# Patient Record
Sex: FEMALE | Race: ASIAN | Employment: FULL TIME | URBAN - METROPOLITAN AREA
[De-identification: names, ages, dates, MRNs, and addresses within clinical notes are randomized per-mention and may not be internally consistent; named-entity substitution may affect disease eponyms.]

---

## 2017-05-23 ENCOUNTER — OFFICE VISIT (OUTPATIENT)
Dept: INTERNAL MEDICINE CLINIC | Facility: CLINIC | Age: 47
End: 2017-05-23

## 2017-05-23 VITALS
SYSTOLIC BLOOD PRESSURE: 121 MMHG | TEMPERATURE: 99 F | DIASTOLIC BLOOD PRESSURE: 75 MMHG | WEIGHT: 126.5 LBS | HEART RATE: 80 BPM | BODY MASS INDEX: 24 KG/M2

## 2017-05-23 DIAGNOSIS — J01.00 ACUTE NON-RECURRENT MAXILLARY SINUSITIS: Primary | ICD-10-CM

## 2017-05-23 PROCEDURE — 99213 OFFICE O/P EST LOW 20 MIN: CPT | Performed by: INTERNAL MEDICINE

## 2017-05-23 PROCEDURE — 99212 OFFICE O/P EST SF 10 MIN: CPT | Performed by: INTERNAL MEDICINE

## 2017-05-23 RX ORDER — CETIRIZINE HYDROCHLORIDE 10 MG/1
10 TABLET ORAL DAILY
COMMUNITY

## 2017-05-23 RX ORDER — AMOXICILLIN 875 MG/1
875 TABLET, COATED ORAL 2 TIMES DAILY
Qty: 20 TABLET | Refills: 0 | Status: SHIPPED | OUTPATIENT
Start: 2017-05-23 | End: 2017-06-02

## 2017-05-23 NOTE — PROGRESS NOTES
HPI:    Patient ID: Amalia Lujan is a 48year old female. Sinus Problem  This is a new problem. The current episode started in the past 7 days. The problem has been gradually worsening since onset. There has been no fever. The pain is mild.  Associated s diaphoretic. Psychiatric: She has a normal mood and affect. Her behavior is normal.   Vitals reviewed. ASSESSMENT/PLAN:   Acute non-recurrent maxillary sinusitis  (primary encounter diagnosis)    Amoxicillin  Fluids.   Rest.    No orders of th

## 2017-05-25 ENCOUNTER — OFFICE VISIT (OUTPATIENT)
Dept: INTERNAL MEDICINE CLINIC | Facility: CLINIC | Age: 47
End: 2017-05-25

## 2017-05-25 VITALS
HEIGHT: 60 IN | DIASTOLIC BLOOD PRESSURE: 70 MMHG | HEART RATE: 76 BPM | SYSTOLIC BLOOD PRESSURE: 106 MMHG | TEMPERATURE: 100 F | BODY MASS INDEX: 24.54 KG/M2 | WEIGHT: 125 LBS

## 2017-05-25 DIAGNOSIS — G47.00 INSOMNIA, UNSPECIFIED TYPE: ICD-10-CM

## 2017-05-25 DIAGNOSIS — M77.02 MEDIAL EPICONDYLITIS OF ELBOW, LEFT: Primary | ICD-10-CM

## 2017-05-25 PROBLEM — M77.00 MEDIAL EPICONDYLITIS OF ELBOW: Status: ACTIVE | Noted: 2017-05-25

## 2017-05-25 PROCEDURE — 99212 OFFICE O/P EST SF 10 MIN: CPT | Performed by: INTERNAL MEDICINE

## 2017-05-25 PROCEDURE — 99213 OFFICE O/P EST LOW 20 MIN: CPT | Performed by: INTERNAL MEDICINE

## 2017-05-25 RX ORDER — ZOLPIDEM TARTRATE 5 MG/1
5 TABLET ORAL NIGHTLY PRN
Qty: 30 TABLET | Refills: 0 | Status: SHIPPED | OUTPATIENT
Start: 2017-05-25 | End: 2017-12-26

## 2017-05-25 RX ORDER — MELOXICAM 15 MG/1
15 TABLET ORAL DAILY
Qty: 14 TABLET | Refills: 0 | Status: SHIPPED | OUTPATIENT
Start: 2017-05-25

## 2017-05-25 NOTE — PROGRESS NOTES
HPI:    Patient ID: Ashleigh Flores is a 48year old female. Elbow Pain   The pain is present in the left elbow. This is a new problem. The current episode started in the past 7 days (2 days). There has been no history of extremity trauma.  The problem occu instead. (G47.00) Insomnia, unspecified type  Plan: pt works night shift as a nurse so  Having difficulty of sleeping in day time and also when she is off work. She had asked for ambien and pt given script for short term use.            No orders of the

## 2017-12-26 ENCOUNTER — OFFICE VISIT (OUTPATIENT)
Dept: INTERNAL MEDICINE CLINIC | Facility: CLINIC | Age: 47
End: 2017-12-26

## 2017-12-26 VITALS
BODY MASS INDEX: 25.16 KG/M2 | TEMPERATURE: 103 F | HEIGHT: 60 IN | HEART RATE: 99 BPM | WEIGHT: 128.13 LBS | DIASTOLIC BLOOD PRESSURE: 86 MMHG | SYSTOLIC BLOOD PRESSURE: 136 MMHG

## 2017-12-26 DIAGNOSIS — J02.9 SORE THROAT: Primary | ICD-10-CM

## 2017-12-26 DIAGNOSIS — R50.81 FEVER IN OTHER DISEASES: ICD-10-CM

## 2017-12-26 PROCEDURE — 99212 OFFICE O/P EST SF 10 MIN: CPT | Performed by: INTERNAL MEDICINE

## 2017-12-26 PROCEDURE — 99214 OFFICE O/P EST MOD 30 MIN: CPT | Performed by: INTERNAL MEDICINE

## 2017-12-26 PROCEDURE — 87880 STREP A ASSAY W/OPTIC: CPT | Performed by: INTERNAL MEDICINE

## 2017-12-26 RX ORDER — AMOXICILLIN 500 MG/1
500 TABLET, FILM COATED ORAL 3 TIMES DAILY
Qty: 30 TABLET | Refills: 0 | Status: SHIPPED | OUTPATIENT
Start: 2017-12-26 | End: 2018-01-25 | Stop reason: ALTCHOICE

## 2017-12-26 RX ORDER — ACETAMINOPHEN 500 MG
500 TABLET ORAL EVERY 6 HOURS PRN
COMMUNITY

## 2017-12-26 NOTE — PROGRESS NOTES
HPI:    Patient ID: Kaur Fernandez is a 50year old female. Fever    This is a new problem. The current episode started yesterday. The problem occurs intermittently. The problem has been waxing and waning. The maximum temperature noted was 102 to 102.9 F. discharge. Left eye exhibits no discharge. No scleral icterus. Neck: Normal range of motion. Neck supple. No neck rigidity. No erythema and normal range of motion present. No Brudzinski's sign noted. No thyromegaly present.    Cardiovascular: Normal rate,

## 2017-12-27 ENCOUNTER — TELEPHONE (OUTPATIENT)
Dept: INTERNAL MEDICINE CLINIC | Facility: CLINIC | Age: 47
End: 2017-12-27

## 2017-12-27 DIAGNOSIS — Z00.00 ANNUAL PHYSICAL EXAM: Primary | ICD-10-CM

## 2018-01-24 ENCOUNTER — NURSE TRIAGE (OUTPATIENT)
Dept: OTHER | Age: 48
End: 2018-01-24

## 2018-01-24 NOTE — TELEPHONE ENCOUNTER
Action Requested: Summary for Provider     []  Critical Lab, Recommendations Needed  [x] Need Additional Advice  []   FYI    []   Need Orders  [] Need Medications Sent to Pharmacy  []  Other     SUMMARY: appt scheduled 1/25/18 for sore throat, no appt avai

## 2018-01-24 NOTE — TELEPHONE ENCOUNTER
MD Michelle Castañeda, RN; Em Rn Triage 1 minute ago (10:39 AM)     No more apptments today; she may consider UC if she does want to be evaluated today.  (Routing comment)

## 2018-01-24 NOTE — TELEPHONE ENCOUNTER
Advised patient of Dr. Bree Jackson note. Patient verbalized understanding and will keep appointment for tomorrow.

## 2018-01-25 ENCOUNTER — OFFICE VISIT (OUTPATIENT)
Dept: INTERNAL MEDICINE CLINIC | Facility: CLINIC | Age: 48
End: 2018-01-25

## 2018-01-25 VITALS
DIASTOLIC BLOOD PRESSURE: 70 MMHG | HEIGHT: 60 IN | WEIGHT: 126 LBS | BODY MASS INDEX: 24.74 KG/M2 | SYSTOLIC BLOOD PRESSURE: 107 MMHG | TEMPERATURE: 98 F | HEART RATE: 67 BPM

## 2018-01-25 DIAGNOSIS — J02.9 SORE THROAT: Primary | ICD-10-CM

## 2018-01-25 DIAGNOSIS — R05.9 COUGH: ICD-10-CM

## 2018-01-25 LAB
CONTROL LINE PRESENT WITH A CLEAR BACKGROUND (YES/NO): YES YES/NO
FLUAV + FLUBV RNA SPEC NAA+PROBE: NEGATIVE
KIT LOT #: NORMAL NUMERIC

## 2018-01-25 PROCEDURE — 87880 STREP A ASSAY W/OPTIC: CPT | Performed by: INTERNAL MEDICINE

## 2018-01-25 PROCEDURE — 99214 OFFICE O/P EST MOD 30 MIN: CPT | Performed by: INTERNAL MEDICINE

## 2018-01-25 PROCEDURE — 99212 OFFICE O/P EST SF 10 MIN: CPT | Performed by: INTERNAL MEDICINE

## 2018-01-25 RX ORDER — AMOXICILLIN AND CLAVULANATE POTASSIUM 875; 125 MG/1; MG/1
1 TABLET, FILM COATED ORAL 2 TIMES DAILY
Qty: 20 TABLET | Refills: 0 | Status: SHIPPED | OUTPATIENT
Start: 2018-01-25 | End: 2018-02-04

## 2018-01-25 NOTE — PROGRESS NOTES
HPI:    Patient ID: Дмитрий Bowen is a 50year old female. Sore Throat    This is a new problem. The current episode started in the past 7 days (3 days). The maximum temperature recorded prior to her arrival was 100.4 - 100.9 F.  The fever has been presen Conjunctivae are normal. Pupils are equal, round, and reactive to light. Right eye exhibits no discharge. Left eye exhibits no discharge. No scleral icterus. Neck: Normal range of motion. Neck supple. No JVD present. No thyromegaly present.    Cardiovascu

## 2018-01-26 ENCOUNTER — TELEPHONE (OUTPATIENT)
Dept: INTERNAL MEDICINE CLINIC | Facility: CLINIC | Age: 48
End: 2018-01-26

## 2018-01-26 RX ORDER — TOBRAMYCIN 3 MG/ML
SOLUTION/ DROPS OPHTHALMIC
Qty: 1 BOTTLE | Refills: 0 | Status: SHIPPED | OUTPATIENT
Start: 2018-01-26

## 2018-01-26 NOTE — TELEPHONE ENCOUNTER
Pt states having drainage in right eye and having some pain/ discomfort and states saw Dr. Marco Zhou yesterday.

## 2018-01-26 NOTE — TELEPHONE ENCOUNTER
Patient saw Dr James Cano yesterday and started augmentin this morning. Patient woke up this morning with discharge from right eye-first was white now it is yellow in color. Right eye is watery, white part of the right eye is red and irritated.   Left eye

## 2018-01-26 NOTE — TELEPHONE ENCOUNTER
Pt called to follow up on medication request. Reviewed doctor's instructions with pt, pt agreed with plan of care. Prescription sent to [facility] pharmacy per doctor's instructions.

## 2019-01-22 ENCOUNTER — OFFICE VISIT (OUTPATIENT)
Dept: INTERNAL MEDICINE CLINIC | Facility: CLINIC | Age: 49
End: 2019-01-22
Payer: COMMERCIAL

## 2019-01-22 VITALS
TEMPERATURE: 100 F | HEART RATE: 98 BPM | BODY MASS INDEX: 24.54 KG/M2 | RESPIRATION RATE: 12 BRPM | DIASTOLIC BLOOD PRESSURE: 71 MMHG | HEIGHT: 60 IN | WEIGHT: 125 LBS | SYSTOLIC BLOOD PRESSURE: 108 MMHG

## 2019-01-22 DIAGNOSIS — J01.90 ACUTE SINUSITIS, RECURRENCE NOT SPECIFIED, UNSPECIFIED LOCATION: Primary | ICD-10-CM

## 2019-01-22 PROBLEM — M77.00 MEDIAL EPICONDYLITIS OF ELBOW: Status: RESOLVED | Noted: 2017-05-25 | Resolved: 2019-01-22

## 2019-01-22 PROCEDURE — 99212 OFFICE O/P EST SF 10 MIN: CPT | Performed by: INTERNAL MEDICINE

## 2019-01-22 PROCEDURE — 99214 OFFICE O/P EST MOD 30 MIN: CPT | Performed by: INTERNAL MEDICINE

## 2019-01-22 RX ORDER — ZOLPIDEM TARTRATE 10 MG/1
10 TABLET ORAL NIGHTLY PRN
Qty: 15 TABLET | Refills: 0 | Status: SHIPPED | OUTPATIENT
Start: 2019-01-22

## 2019-01-22 RX ORDER — MULTIVIT WITH MINERALS/LUTEIN
1000 TABLET ORAL DAILY
COMMUNITY

## 2019-01-22 RX ORDER — AMOXICILLIN 875 MG/1
875 TABLET, COATED ORAL 2 TIMES DAILY
Qty: 20 TABLET | Refills: 0 | Status: SHIPPED | OUTPATIENT
Start: 2019-01-22

## 2019-01-22 NOTE — PROGRESS NOTES
HPI:    Patient ID: Sage Brunson is a 46year old female. Nasal Congestion   Associated symptoms include congestion, a fever and headaches. Pertinent negatives include no sore throat or swollen glands. Sinus Problem   This is a new problem.  The jaye edema and rhinorrhea present. Left sinus exhibits frontal sinus tenderness. Mouth/Throat: Oropharynx is clear and moist. No oropharyngeal exudate. Eyes: Conjunctivae are normal. Right eye exhibits no discharge. Left eye exhibits no discharge.  No sclera

## 2020-05-19 RX ORDER — ZOLPIDEM TARTRATE 10 MG/1
TABLET ORAL
Qty: 15 TABLET | Refills: 0 | OUTPATIENT
Start: 2020-05-19

## 2020-08-22 ENCOUNTER — OFFICE VISIT (OUTPATIENT)
Dept: INTERNAL MEDICINE CLINIC | Facility: CLINIC | Age: 50
End: 2020-08-22
Payer: COMMERCIAL

## 2020-08-22 ENCOUNTER — LAB ENCOUNTER (OUTPATIENT)
Dept: LAB | Age: 50
End: 2020-08-22
Attending: INTERNAL MEDICINE
Payer: COMMERCIAL

## 2020-08-22 VITALS
WEIGHT: 132 LBS | TEMPERATURE: 97 F | HEART RATE: 76 BPM | BODY MASS INDEX: 24.92 KG/M2 | HEIGHT: 61 IN | SYSTOLIC BLOOD PRESSURE: 136 MMHG | DIASTOLIC BLOOD PRESSURE: 86 MMHG

## 2020-08-22 DIAGNOSIS — M54.50 ACUTE LEFT-SIDED LOW BACK PAIN WITHOUT SCIATICA: ICD-10-CM

## 2020-08-22 DIAGNOSIS — Z78.0 MENOPAUSE: ICD-10-CM

## 2020-08-22 DIAGNOSIS — Z00.00 ANNUAL PHYSICAL EXAM: ICD-10-CM

## 2020-08-22 DIAGNOSIS — Z00.00 ANNUAL PHYSICAL EXAM: Primary | ICD-10-CM

## 2020-08-22 LAB
ALBUMIN SERPL-MCNC: 3.6 G/DL (ref 3.4–5)
ALBUMIN/GLOB SERPL: 0.8 {RATIO} (ref 1–2)
ALP LIVER SERPL-CCNC: 84 U/L (ref 41–108)
ALT SERPL-CCNC: 34 U/L (ref 13–56)
ANION GAP SERPL CALC-SCNC: 5 MMOL/L (ref 0–18)
AST SERPL-CCNC: 27 U/L (ref 15–37)
BILIRUB SERPL-MCNC: 0.4 MG/DL (ref 0.1–2)
BUN BLD-MCNC: 12 MG/DL (ref 7–18)
BUN/CREAT SERPL: 18.8 (ref 10–20)
CALCIUM BLD-MCNC: 9.3 MG/DL (ref 8.5–10.1)
CHLORIDE SERPL-SCNC: 110 MMOL/L (ref 98–112)
CHOLEST SMN-MCNC: 173 MG/DL (ref ?–200)
CO2 SERPL-SCNC: 26 MMOL/L (ref 21–32)
CREAT BLD-MCNC: 0.64 MG/DL (ref 0.55–1.02)
EST. AVERAGE GLUCOSE BLD GHB EST-MCNC: 137 MG/DL (ref 68–126)
GLOBULIN PLAS-MCNC: 4.5 G/DL (ref 2.8–4.4)
GLUCOSE BLD-MCNC: 115 MG/DL (ref 70–99)
HBA1C MFR BLD HPLC: 6.4 % (ref ?–5.7)
HDLC SERPL-MCNC: 63 MG/DL (ref 40–59)
LDLC SERPL CALC-MCNC: 93 MG/DL (ref ?–100)
M PROTEIN MFR SERPL ELPH: 8.1 G/DL (ref 6.4–8.2)
NONHDLC SERPL-MCNC: 110 MG/DL (ref ?–130)
OSMOLALITY SERPL CALC.SUM OF ELEC: 293 MOSM/KG (ref 275–295)
PATIENT FASTING Y/N/NP: YES
PATIENT FASTING Y/N/NP: YES
POTASSIUM SERPL-SCNC: 4.1 MMOL/L (ref 3.5–5.1)
SODIUM SERPL-SCNC: 141 MMOL/L (ref 136–145)
TRIGL SERPL-MCNC: 83 MG/DL (ref 30–149)
VLDLC SERPL CALC-MCNC: 17 MG/DL (ref 0–30)

## 2020-08-22 PROCEDURE — 82306 VITAMIN D 25 HYDROXY: CPT

## 2020-08-22 PROCEDURE — 36415 COLL VENOUS BLD VENIPUNCTURE: CPT

## 2020-08-22 PROCEDURE — 3079F DIAST BP 80-89 MM HG: CPT | Performed by: INTERNAL MEDICINE

## 2020-08-22 PROCEDURE — 83036 HEMOGLOBIN GLYCOSYLATED A1C: CPT

## 2020-08-22 PROCEDURE — 85060 BLOOD SMEAR INTERPRETATION: CPT

## 2020-08-22 PROCEDURE — 80053 COMPREHEN METABOLIC PANEL: CPT

## 2020-08-22 PROCEDURE — 99396 PREV VISIT EST AGE 40-64: CPT | Performed by: INTERNAL MEDICINE

## 2020-08-22 PROCEDURE — 3075F SYST BP GE 130 - 139MM HG: CPT | Performed by: INTERNAL MEDICINE

## 2020-08-22 PROCEDURE — 85025 COMPLETE CBC W/AUTO DIFF WBC: CPT

## 2020-08-22 PROCEDURE — 80061 LIPID PANEL: CPT

## 2020-08-22 PROCEDURE — 3008F BODY MASS INDEX DOCD: CPT | Performed by: INTERNAL MEDICINE

## 2020-08-22 RX ORDER — CYCLOBENZAPRINE HCL 10 MG
10 TABLET ORAL NIGHTLY
Qty: 15 TABLET | Refills: 0 | Status: SHIPPED | OUTPATIENT
Start: 2020-08-22 | End: 2020-09-11

## 2020-08-23 PROBLEM — Z00.00 ANNUAL PHYSICAL EXAM: Status: ACTIVE | Noted: 2020-08-23

## 2020-08-23 PROBLEM — M54.50 ACUTE LEFT-SIDED LOW BACK PAIN WITHOUT SCIATICA: Status: ACTIVE | Noted: 2020-08-23

## 2020-08-23 PROBLEM — Z78.0 MENOPAUSE: Status: ACTIVE | Noted: 2020-08-23

## 2020-08-23 NOTE — PROGRESS NOTES
HPI:    Patient ID: Giles Conrad is a 44year old female. Patient presents today for her annual checkup. Low Back Pain   This is a new problem. The current episode started 1 to 4 weeks ago. The problem has been waxing and waning since onset.  The p (875 mg total) by mouth 2 (two) times daily.  (Patient not taking: Reported on 8/22/2020 ) 20 tablet 0   • Tobramycin Sulfate (TOBREX) 0.3 % Ophthalmic Solution Two drops to affected eye four times a day (Patient not taking: Reported on 8/22/2020 ) 1 Bottle ASSESSMENT/PLAN:   (Z00.00) Annual physical exam  (primary encounter diagnosis)  Plan: COMP METABOLIC PANEL (14), CBC WITH         DIFFERENTIAL WITH PLATELET, HEMOGLOBIN A1C,         LIPID PANEL, VITAMIN D, 25-HYDROXY        Routine labs ordered.  Pt advi

## 2020-08-24 LAB
25(OH)D3 SERPL-MCNC: 31.9 NG/ML (ref 30–100)
BASOPHILS # BLD AUTO: 0.04 X10(3) UL (ref 0–0.2)
BASOPHILS NFR BLD AUTO: 0.5 %
DEPRECATED RDW RBC AUTO: 33.7 FL (ref 35.1–46.3)
EOSINOPHIL # BLD AUTO: 0.1 X10(3) UL (ref 0–0.7)
EOSINOPHIL NFR BLD AUTO: 1.3 %
ERYTHROCYTE [DISTWIDTH] IN BLOOD BY AUTOMATED COUNT: 14.7 % (ref 11–15)
HCT VFR BLD AUTO: 37.9 % (ref 35–48)
HGB BLD-MCNC: 11.6 G/DL (ref 12–16)
IMM GRANULOCYTES # BLD AUTO: 0.02 X10(3) UL (ref 0–1)
IMM GRANULOCYTES NFR BLD: 0.3 %
LYMPHOCYTES # BLD AUTO: 1.87 X10(3) UL (ref 1–4)
LYMPHOCYTES NFR BLD AUTO: 24.4 %
MCH RBC QN AUTO: 20.2 PG (ref 26–34)
MCHC RBC AUTO-ENTMCNC: 30.6 G/DL (ref 31–37)
MCV RBC AUTO: 66.1 FL (ref 80–100)
MONOCYTES # BLD AUTO: 0.63 X10(3) UL (ref 0.1–1)
MONOCYTES NFR BLD AUTO: 8.2 %
NEUTROPHILS # BLD AUTO: 5 X10 (3) UL (ref 1.5–7.7)
NEUTROPHILS # BLD AUTO: 5 X10(3) UL (ref 1.5–7.7)
NEUTROPHILS NFR BLD AUTO: 65.3 %
PLATELET # BLD AUTO: 274 10(3)UL (ref 150–450)
RBC # BLD AUTO: 5.73 X10(6)UL (ref 3.8–5.3)
WBC # BLD AUTO: 7.7 X10(3) UL (ref 4–11)

## 2020-08-25 ENCOUNTER — TELEPHONE (OUTPATIENT)
Dept: INTERNAL MEDICINE CLINIC | Facility: CLINIC | Age: 50
End: 2020-08-25

## 2020-08-25 DIAGNOSIS — D50.9 MICROCYTIC ANEMIA: ICD-10-CM

## 2020-08-25 DIAGNOSIS — R77.1 HYPERGLOBULINEMIA: Primary | ICD-10-CM

## 2020-09-29 ENCOUNTER — TELEPHONE (OUTPATIENT)
Dept: INTERNAL MEDICINE CLINIC | Facility: CLINIC | Age: 50
End: 2020-09-29

## 2020-09-29 DIAGNOSIS — R73.09 ELEVATED HEMOGLOBIN A1C: ICD-10-CM

## 2020-09-29 DIAGNOSIS — Z12.11 SCREENING FOR COLON CANCER: Primary | ICD-10-CM

## 2020-09-29 NOTE — TELEPHONE ENCOUNTER
Per noted pateint is to have A1C rechecked in 6 months, not 3. Referral pended for GI. Dr. Taylor Mention please advise.

## 2020-09-29 NOTE — TELEPHONE ENCOUNTER
Pt also needs to do additional labs as ffup from her labs last month, she has anemia. See prevous mychart lab result note. If she does have iron def anemia, she will need EGD and colonoscopy done by gastro.

## 2020-09-29 NOTE — TELEPHONE ENCOUNTER
Pt would like an order/referral to get her colonoscopy with Dr. Justice Eng. Per pt this was discussed at the last office visit. Please, call pt when the order is in the system. Pt is also requesting an order to get her AIC retested in November.  P

## 2020-11-02 NOTE — TELEPHONE ENCOUNTER
No phone response letter mailed as certified letter mailed to patient. 3rd attempt to contact patient was completed today. No answer.

## 2020-11-02 NOTE — TELEPHONE ENCOUNTER
pls send this patient certified mail regarding test result showing iron defiiciency anemia and that she needs to see gastroenetrologist for GI workup to find cause of her anemia.

## 2020-11-17 ENCOUNTER — HOSPITAL ENCOUNTER (OUTPATIENT)
Dept: BONE DENSITY | Age: 50
Discharge: HOME | End: 2020-11-17
Attending: INTERNAL MEDICINE
Payer: COMMERCIAL

## 2020-11-17 DIAGNOSIS — Z78.0 MENOPAUSE: ICD-10-CM

## 2020-11-17 PROCEDURE — 77080 DXA BONE DENSITY AXIAL: CPT | Performed by: INTERNAL MEDICINE

## 2020-11-24 ENCOUNTER — LAB ENCOUNTER (OUTPATIENT)
Dept: LAB | Age: 50
End: 2020-11-24
Attending: INTERNAL MEDICINE
Payer: COMMERCIAL

## 2020-11-24 ENCOUNTER — OFFICE VISIT (OUTPATIENT)
Dept: INTERNAL MEDICINE CLINIC | Facility: CLINIC | Age: 50
End: 2020-11-24
Payer: COMMERCIAL

## 2020-11-24 VITALS — BODY MASS INDEX: 23.98 KG/M2 | TEMPERATURE: 99 F | WEIGHT: 127 LBS | HEIGHT: 61 IN

## 2020-11-24 DIAGNOSIS — R73.09 ELEVATED HEMOGLOBIN A1C: ICD-10-CM

## 2020-11-24 DIAGNOSIS — D50.9 MICROCYTIC ANEMIA: ICD-10-CM

## 2020-11-24 DIAGNOSIS — R77.1 HYPERGLOBULINEMIA: ICD-10-CM

## 2020-11-24 DIAGNOSIS — R03.0 ELEVATED BLOOD PRESSURE READING: Primary | ICD-10-CM

## 2020-11-24 DIAGNOSIS — R71.8 RBC MICROCYTOSIS: ICD-10-CM

## 2020-11-24 PROCEDURE — 86334 IMMUNOFIX E-PHORESIS SERUM: CPT

## 2020-11-24 PROCEDURE — 99072 ADDL SUPL MATRL&STAF TM PHE: CPT | Performed by: INTERNAL MEDICINE

## 2020-11-24 PROCEDURE — 3008F BODY MASS INDEX DOCD: CPT | Performed by: INTERNAL MEDICINE

## 2020-11-24 PROCEDURE — 83020 HEMOGLOBIN ELECTROPHORESIS: CPT

## 2020-11-24 PROCEDURE — 36415 COLL VENOUS BLD VENIPUNCTURE: CPT

## 2020-11-24 PROCEDURE — 99214 OFFICE O/P EST MOD 30 MIN: CPT | Performed by: INTERNAL MEDICINE

## 2020-11-24 PROCEDURE — 83540 ASSAY OF IRON: CPT

## 2020-11-24 PROCEDURE — 84165 PROTEIN E-PHORESIS SERUM: CPT

## 2020-11-24 PROCEDURE — 83021 HEMOGLOBIN CHROMOTOGRAPHY: CPT

## 2020-11-24 PROCEDURE — 82728 ASSAY OF FERRITIN: CPT

## 2020-11-24 PROCEDURE — 84466 ASSAY OF TRANSFERRIN: CPT

## 2020-11-24 RX ORDER — CHOLECALCIFEROL (VITAMIN D3) 125 MCG
CAPSULE ORAL
COMMUNITY

## 2020-11-24 NOTE — PROGRESS NOTES
HPI:    Patient ID: Silvano Frazier is a 44year old female. Hypertension  This is a new problem. The current episode started 1 to 4 weeks ago. The problem has been waxing and waning since onset. The problem is uncontrolled.  Pertinent negatives includ History:   Procedure Laterality Date   • COLONOSCOPY      at 41 y.o   • OTHER SURGICAL HISTORY      kidney stone extraction 2001      Family History   Problem Relation Age of Onset   • Diabetes Father    • Hypertension Father    • Other (Other) Mother blood pressure has been noted to be elevated occasionally for the past few weeks however today is better. I just told her to monitor blood pressure and just follow low-sodium diet for now. May also add some regular physical exercises.   She was told to ca

## 2023-09-15 ENCOUNTER — ORDER TRANSCRIPTION (OUTPATIENT)
Dept: ADMINISTRATIVE | Facility: HOSPITAL | Age: 53
End: 2023-09-15

## 2023-09-15 ENCOUNTER — OFFICE VISIT (OUTPATIENT)
Dept: INTERNAL MEDICINE CLINIC | Facility: CLINIC | Age: 53
End: 2023-09-15

## 2023-09-15 VITALS
HEART RATE: 79 BPM | DIASTOLIC BLOOD PRESSURE: 84 MMHG | SYSTOLIC BLOOD PRESSURE: 136 MMHG | OXYGEN SATURATION: 98 % | BODY MASS INDEX: 24.24 KG/M2 | TEMPERATURE: 99 F | HEIGHT: 61 IN | WEIGHT: 128.38 LBS

## 2023-09-15 DIAGNOSIS — M54.2 CHRONIC NECK PAIN: ICD-10-CM

## 2023-09-15 DIAGNOSIS — M54.50 RECURRENT LOW BACK PAIN: ICD-10-CM

## 2023-09-15 DIAGNOSIS — R71.8 RBC MICROCYTOSIS: ICD-10-CM

## 2023-09-15 DIAGNOSIS — Z00.00 ANNUAL PHYSICAL EXAM: Primary | ICD-10-CM

## 2023-09-15 DIAGNOSIS — Z12.11 COLON CANCER SCREENING: ICD-10-CM

## 2023-09-15 DIAGNOSIS — Z12.31 ENCOUNTER FOR SCREENING MAMMOGRAM FOR BREAST CANCER: ICD-10-CM

## 2023-09-15 DIAGNOSIS — Z13.6 SCREENING FOR ISCHEMIC HEART DISEASE: Primary | ICD-10-CM

## 2023-09-15 DIAGNOSIS — G89.29 CHRONIC NECK PAIN: ICD-10-CM

## 2023-09-15 DIAGNOSIS — I10 PRIMARY HYPERTENSION: ICD-10-CM

## 2023-09-15 DIAGNOSIS — Z12.4 CERVICAL CANCER SCREENING: ICD-10-CM

## 2023-09-15 PROCEDURE — 3075F SYST BP GE 130 - 139MM HG: CPT | Performed by: INTERNAL MEDICINE

## 2023-09-15 PROCEDURE — 3008F BODY MASS INDEX DOCD: CPT | Performed by: INTERNAL MEDICINE

## 2023-09-15 PROCEDURE — 99396 PREV VISIT EST AGE 40-64: CPT | Performed by: INTERNAL MEDICINE

## 2023-09-15 PROCEDURE — 3079F DIAST BP 80-89 MM HG: CPT | Performed by: INTERNAL MEDICINE

## 2023-09-15 RX ORDER — BIOTIN 100 %
POWDER (GRAM) MISCELLANEOUS
COMMUNITY

## 2023-09-15 RX ORDER — ALPRAZOLAM 0.5 MG/1
0.5 TABLET ORAL
COMMUNITY
Start: 2023-04-20

## 2023-09-15 RX ORDER — PANTOPRAZOLE SODIUM 40 MG/1
1 TABLET, DELAYED RELEASE ORAL DAILY
COMMUNITY
Start: 2020-11-19

## 2023-09-15 RX ORDER — AMLODIPINE BESYLATE 2.5 MG/1
2.5 TABLET ORAL DAILY
Qty: 90 TABLET | Refills: 1 | Status: SHIPPED | OUTPATIENT
Start: 2023-09-15

## 2023-09-15 RX ORDER — RUFINAMIDE 40 MG/ML
1 SUSPENSION ORAL DAILY
COMMUNITY

## 2023-09-20 ENCOUNTER — MED REC SCAN ONLY (OUTPATIENT)
Dept: INTERNAL MEDICINE CLINIC | Facility: CLINIC | Age: 53
End: 2023-09-20

## 2023-09-22 ENCOUNTER — HOSPITAL ENCOUNTER (OUTPATIENT)
Dept: CT IMAGING | Age: 53
Discharge: HOME | End: 2023-09-22
Attending: INTERNAL MEDICINE

## 2023-09-22 DIAGNOSIS — Z13.6 SCREENING FOR ISCHEMIC HEART DISEASE: ICD-10-CM

## 2023-09-25 ENCOUNTER — HOSPITAL ENCOUNTER (OUTPATIENT)
Dept: MAMMOGRAPHY | Age: 53
Discharge: HOME | End: 2023-09-25
Attending: INTERNAL MEDICINE
Payer: COMMERCIAL

## 2023-09-25 DIAGNOSIS — Z12.31 ENCOUNTER FOR SCREENING MAMMOGRAM FOR BREAST CANCER: ICD-10-CM

## 2023-09-25 PROCEDURE — 77063 BREAST TOMOSYNTHESIS BI: CPT | Performed by: INTERNAL MEDICINE

## 2023-09-25 PROCEDURE — 77067 SCR MAMMO BI INCL CAD: CPT | Performed by: INTERNAL MEDICINE

## 2023-09-29 ENCOUNTER — TELEPHONE (OUTPATIENT)
Dept: INTERNAL MEDICINE CLINIC | Facility: CLINIC | Age: 53
End: 2023-09-29

## 2023-09-29 DIAGNOSIS — E04.1 THYROID NODULE: Primary | ICD-10-CM

## 2023-10-01 ENCOUNTER — TELEPHONE (OUTPATIENT)
Dept: INTERNAL MEDICINE CLINIC | Facility: CLINIC | Age: 53
End: 2023-10-01

## 2023-10-01 DIAGNOSIS — R92.2 DENSE BREAST: Primary | ICD-10-CM

## 2023-10-01 DIAGNOSIS — R92.30 DENSE BREAST: Primary | ICD-10-CM

## 2023-11-01 ENCOUNTER — HOSPITAL ENCOUNTER (OUTPATIENT)
Dept: ULTRASOUND IMAGING | Facility: HOSPITAL | Age: 53
Discharge: HOME | End: 2023-11-01
Attending: INTERNAL MEDICINE
Payer: COMMERCIAL

## 2023-11-01 DIAGNOSIS — E04.1 THYROID NODULE: ICD-10-CM

## 2023-11-01 PROCEDURE — 76536 US EXAM OF HEAD AND NECK: CPT | Performed by: INTERNAL MEDICINE

## 2023-11-02 ENCOUNTER — TELEPHONE (OUTPATIENT)
Dept: INTERNAL MEDICINE CLINIC | Facility: CLINIC | Age: 53
End: 2023-11-02

## 2024-09-30 NOTE — LETTER
8/22/2020              Octavio Hunter        [address]        [phone]         To whom it may concern,      This is to certify that Ms Joann Levi was seen and evaluated in our clinic today.  She was advised to avoid lifting more than 10
(V5) oriented

## 2024-11-04 ENCOUNTER — OFFICE VISIT (OUTPATIENT)
Dept: INTERNAL MEDICINE CLINIC | Facility: CLINIC | Age: 54
End: 2024-11-04

## 2024-11-04 VITALS
DIASTOLIC BLOOD PRESSURE: 80 MMHG | WEIGHT: 127 LBS | HEART RATE: 89 BPM | BODY MASS INDEX: 24 KG/M2 | SYSTOLIC BLOOD PRESSURE: 124 MMHG

## 2024-11-04 DIAGNOSIS — Z00.00 ANNUAL PHYSICAL EXAM: Primary | ICD-10-CM

## 2024-11-04 DIAGNOSIS — E11.9 CONTROLLED TYPE 2 DIABETES MELLITUS WITHOUT COMPLICATION, WITHOUT LONG-TERM CURRENT USE OF INSULIN (HCC): ICD-10-CM

## 2024-11-04 DIAGNOSIS — Z12.31 ENCOUNTER FOR SCREENING MAMMOGRAM FOR BREAST CANCER: ICD-10-CM

## 2024-11-04 DIAGNOSIS — E78.00 HYPERCHOLESTEREMIA: ICD-10-CM

## 2024-11-04 RX ORDER — FLUTICASONE PROPIONATE 110 UG/1
2 AEROSOL, METERED RESPIRATORY (INHALATION) 2 TIMES DAILY
COMMUNITY
Start: 2024-10-11

## 2024-11-05 NOTE — PROGRESS NOTES
Subjective:     Patient ID: Sedrick Hunter is a 54 year old female.    Patient presents today for her annual physical.         History/Other:   Review of Systems   Constitutional: Negative.    HENT: Negative.     Eyes: Negative.    Respiratory: Negative.     Cardiovascular: Negative.    Gastrointestinal: Negative.    Genitourinary: Negative.    Allergic/Immunologic: Negative for food allergies and immunocompromised state.     Current Outpatient Medications   Medication Sig Dispense Refill    fluticasone propionate 110 MCG/ACT Inhalation Aerosol Inhale 2 puffs into the lungs 2 (two) times daily.      multivitamin Oral Chew Tab Chew 1 tablet by mouth daily.      pantoprazole 40 MG Oral Tab EC Take 1 tablet (40 mg total) by mouth daily.      ALPRAZolam 0.5 MG Oral Tab Take 1 tablet (0.5 mg total) by mouth.      Melatonin 5 MG Oral Tab Take by mouth.      Ascorbic Acid (VITAMIN C) 1000 MG Oral Tab Take 1 tablet (1,000 mg total) by mouth daily.      Cholecalciferol (VITAMIN D) 1000 units Oral Tab Take 1 capsule by mouth daily.      acetaminophen 500 MG Oral Tab Take 1 tablet (500 mg total) by mouth every 6 (six) hours as needed for Pain.      cetirizine 10 MG Oral Tab Take 1 tablet (10 mg total) by mouth daily.      Biotin Does not apply Powder  (Patient not taking: Reported on 11/4/2024)       Allergies:Allergies[1]    Past Medical History:    Acute, but ill-defined, cerebrovascular disease    Calculus of kidney    Prediabetes    Thalassemia trait      Past Surgical History:   Procedure Laterality Date    Colonoscopy      at 40 y.o    Other surgical history      kidney stone extraction 2001      Family History   Problem Relation Age of Onset    Stroke Father     Diabetes Father     Hypertension Father     Hypertension Mother     Other (Other) Mother         osteoporosis and thalassemia    Other (Other) Son         IGa  nephropathy    Other (Other) Sister         thyroid disorder    Hypertension Brother       Social  History:   Social History     Socioeconomic History    Marital status:    Tobacco Use    Smoking status: Never     Passive exposure: Never    Smokeless tobacco: Never   Vaping Use    Vaping status: Never Used   Substance and Sexual Activity    Alcohol use: No     Alcohol/week: 0.0 standard drinks of alcohol    Drug use: No        Objective:   Physical Exam  Constitutional:       General: She is not in acute distress.     Appearance: Normal appearance. She is well-developed. She is not ill-appearing, toxic-appearing or diaphoretic.   HENT:      Head: Normocephalic and atraumatic.      Right Ear: Tympanic membrane, ear canal and external ear normal.      Left Ear: Tympanic membrane, ear canal and external ear normal.      Nose: Nose normal.      Mouth/Throat:      Pharynx: No oropharyngeal exudate.   Eyes:      General:         Right eye: No discharge.         Left eye: No discharge.      Conjunctiva/sclera: Conjunctivae normal.      Pupils: Pupils are equal, round, and reactive to light.   Neck:      Vascular: No carotid bruit or JVD.   Cardiovascular:      Rate and Rhythm: Normal rate and regular rhythm.      Pulses:           Dorsalis pedis pulses are 3+ on the right side and 3+ on the left side.        Posterior tibial pulses are 3+ on the right side and 3+ on the left side.      Heart sounds: Normal heart sounds. No murmur heard.  Pulmonary:      Effort: Pulmonary effort is normal. No respiratory distress.      Breath sounds: Normal breath sounds. No wheezing or rales.   Abdominal:      General: Bowel sounds are normal. There is no distension.      Palpations: Abdomen is soft. There is no mass.      Tenderness: There is no abdominal tenderness. There is no guarding or rebound.   Musculoskeletal:         General: No tenderness. Normal range of motion.      Cervical back: Normal range of motion and neck supple. No rigidity or tenderness.      Right lower leg: No edema.      Left lower leg: No edema.       Right foot: No deformity.      Left foot: No deformity.   Feet:      Right foot:      Protective Sensation: 10 sites tested.  10 sites sensed.      Skin integrity: Skin integrity normal.      Toenail Condition: Right toenails are normal.      Left foot:      Protective Sensation: 10 sites tested.  10 sites sensed.      Skin integrity: Skin integrity normal.      Toenail Condition: Left toenails are normal.   Lymphadenopathy:      Cervical: No cervical adenopathy.   Skin:     General: Skin is warm and dry.      Coloration: Skin is not jaundiced or pale.      Findings: No rash.   Neurological:      Mental Status: She is alert and oriented to person, place, and time.         Assessment & Plan:   (Z00.00) Annual physical exam  (primary encounter diagnosis)  Plan: pt will get her flu shot at work, advised to get her shingrix vaccine; pt also told to see gyne for her papsmear.     (E11.9) Controlled type 2 diabetes mellitus without complication, without long-term current use of insulin (HCC)  Plan: Hemoglobin A1C, Microalb/Creat Ratio, Random         Urine        [place] was 6.6 just about 3mos ago; will check again her A1c; she will ffup with [place] pcp in [place] and will decide regarding starting metformin.  Pt also told to see optha for her dm eye exam.     (E78.00) Hypercholesteremia  Plan: Comp Metabolic Panel (14), Lipid Panel        Check lipid panel; d/w pt since she is now diabetic based on A1c of 6.6 , needs to get her LDL at least less than 100.     (Z12.31) Encounter for screening mammogram for breast cancer  Plan: [facility] 2D+3D SCREENING BILAT         (CPT=77067/47724)        Mammogram ordered.        Orders Placed This Encounter   Procedures    Comp Metabolic Panel (14)    Lipid Panel    Hemoglobin A1C    Microalb/Creat Ratio, Random Urine       Meds This Visit:  Requested Prescriptions      No prescriptions requested or ordered in this encounter       Imaging & Referrals:  ANAIS LEE  2D+3D SCREENING BILAT (CPT=77067/86579)            [1]   Allergies  Allergen Reactions    Levaquin [Levofloxacin] RASH

## (undated) NOTE — Clinical Note
5/25/2017              Octavio Hunter        [address]        [phone]         To whom it may concern,      This is to certify that Ms Nathaly Brooks was seen and evaluated in my clinic today.  She should be excused from work on May 26, 201

## (undated) NOTE — LETTER
11/2/2020              Octavio Hunter        [address]        [address] [facility]         Dear Yolie Quintero,    This letter is to inform you that our office has made several attempts to reach you by phone without success.   We were attempting to contact you

## (undated) NOTE — Clinical Note
5/23/2017          To Whom It May Concern:    Imelda Martinez is currently under my medical care and may not return to work at this time. Please excuse Ninfe for 3 days, 05/22/17, 05/23/17 & 05/24/17. She may return to work on 05/26/2017.   Activity is res

## (undated) NOTE — MR AVS SNAPSHOT
[facility]  [address], [address]  [phone]               Thank you for choosing us for your health care visit with LEIGHANN Lord MD.  We are glad to serve you and happy to provide you with this summary of discharge instructions in [facility] by going to Visits < Admission Summaries. If you've been to the Emergency Department or your doctor's office, you can view your past visit information in [employer] by going to Visits < Visit Summaries. Solaiemes questions?

## (undated) NOTE — LETTER
12/26/2017              Octavio Hunter        [address]        [phone]         To whom it may concern,      This is to certify that Ms Florence Rojas was seen and evaluated today.  She had been diagnosed with streptococcal tonsillitis today

## (undated) NOTE — MR AVS SNAPSHOT
[address] [address]  [phone]               Thank you for choosing us for your health care visit with Janeth Mckeon MD.  We are glad to serve you and happy to provide you with this summar You can get these medications from any pharmacy     Bring a paper prescription for each of these medications    - Zolpidem Tartrate 5 MG Tabs            MyChart     Visit MyChart  You can access your MyChart to more actively manage your health care and vie